# Patient Record
Sex: FEMALE | URBAN - METROPOLITAN AREA
[De-identification: names, ages, dates, MRNs, and addresses within clinical notes are randomized per-mention and may not be internally consistent; named-entity substitution may affect disease eponyms.]

---

## 2023-09-26 ENCOUNTER — NURSE TRIAGE (OUTPATIENT)
Dept: NURSING | Facility: CLINIC | Age: 33
End: 2023-09-26

## 2023-09-27 NOTE — TELEPHONE ENCOUNTER
Patient is 7.5 weeks postpartum.  Patient believes she has mastitis in her breast.  Patient has a fever 101.1, redness, warm to touch and pain.    Patient states she is not feeling well.    Care advise: should be seen in 4 hours, ok to take pain medications as needed.  Patient states she will go to ED for evaluation.    Shelly Marcano RN   09/26/23 7:50 PM  [facility] Nurse Advisor      Reason for Disposition   [1] Breast looks infected (spreading redness, feels hot or painful to touch) AND [2] fever    Additional Information   Negative: Chest pain   Negative: Breastfeeding questions about baby   Negative: Breastfeeding questions about mother (breast symptoms or feeling sick)   Negative: Breastfeeding questions about mother's medicines and diet   Negative: Postpartum breast pain and swelling, not breastfeeding   Negative: Small spot, skin growth or mole   Negative: [1] SEVERE breast pain AND [2] fever > 103 F (39.4 C)   Negative: Patient sounds very sick or weak to the triager    Protocols used: Breast Symptoms-A-AH

## 2025-01-08 ENCOUNTER — TELEPHONE (OUTPATIENT)
Dept: NURSING | Facility: CLINIC | Age: 35
End: 2025-01-08

## 2025-01-08 NOTE — TELEPHONE ENCOUNTER
Telephone call  Patient did a  on line visit and it was recommended that she be seen.  She had a UTI in dec and was put on cephalexin she is having   burning sensation  again and she aslo is coughing and   congested  her temp is running 102-104. The advice was for her to be seen in clinic so she will call at 8 when the phones switch over for a appointment.    Sariah Estrella RN   [facility] Nurse Advisor  7:41 AM 1/8/2025